# Patient Record
Sex: MALE | Race: WHITE | NOT HISPANIC OR LATINO | Employment: OTHER | URBAN - METROPOLITAN AREA
[De-identification: names, ages, dates, MRNs, and addresses within clinical notes are randomized per-mention and may not be internally consistent; named-entity substitution may affect disease eponyms.]

---

## 2018-02-20 ENCOUNTER — APPOINTMENT (OUTPATIENT)
Dept: GENERAL RADIOLOGY | Facility: HOSPITAL | Age: 43
End: 2018-02-20

## 2018-02-20 PROCEDURE — 73610 X-RAY EXAM OF ANKLE: CPT | Performed by: GENERAL PRACTICE

## 2018-02-20 PROCEDURE — 73630 X-RAY EXAM OF FOOT: CPT | Performed by: GENERAL PRACTICE

## 2018-02-27 ENCOUNTER — APPOINTMENT (OUTPATIENT)
Dept: GENERAL RADIOLOGY | Facility: HOSPITAL | Age: 43
End: 2018-02-27

## 2018-02-27 ENCOUNTER — HOSPITAL ENCOUNTER (EMERGENCY)
Facility: HOSPITAL | Age: 43
Discharge: HOME | End: 2018-02-27
Attending: EMERGENCY MEDICINE | Admitting: EMERGENCY MEDICINE

## 2018-02-27 VITALS
TEMPERATURE: 98 F | RESPIRATION RATE: 18 BRPM | BODY MASS INDEX: 23.86 KG/M2 | SYSTOLIC BLOOD PRESSURE: 125 MMHG | OXYGEN SATURATION: 96 % | DIASTOLIC BLOOD PRESSURE: 84 MMHG | WEIGHT: 180 LBS | HEIGHT: 73 IN | HEART RATE: 82 BPM

## 2018-02-27 DIAGNOSIS — S93.422A SPRAIN OF DELTOID LIGAMENT OF LEFT ANKLE, INITIAL ENCOUNTER: Primary | ICD-10-CM

## 2018-02-27 PROCEDURE — 73630 X-RAY EXAM OF FOOT: CPT

## 2018-02-27 PROCEDURE — 73610 X-RAY EXAM OF ANKLE: CPT

## 2018-02-27 PROCEDURE — 99283 EMERGENCY DEPT VISIT LOW MDM: CPT

## 2018-03-01 ENCOUNTER — OFFICE VISIT (OUTPATIENT)
Dept: ORTHOPEDIC SURGERY | Facility: CLINIC | Age: 43
End: 2018-03-01

## 2018-03-01 VITALS — BODY MASS INDEX: 23.86 KG/M2 | HEIGHT: 73 IN | WEIGHT: 180 LBS | TEMPERATURE: 98.8 F

## 2018-03-01 DIAGNOSIS — S93.402A SPRAIN OF LEFT ANKLE, UNSPECIFIED LIGAMENT, INITIAL ENCOUNTER: Primary | ICD-10-CM

## 2018-03-01 DIAGNOSIS — S86.302A INJURY OF PERONEAL TENDON OF LEFT FOOT, INITIAL ENCOUNTER: ICD-10-CM

## 2018-03-01 PROCEDURE — 99204 OFFICE O/P NEW MOD 45 MIN: CPT | Performed by: ORTHOPAEDIC SURGERY

## 2018-03-01 RX ORDER — NAPROXEN SODIUM 220 MG
220 TABLET ORAL 2 TIMES DAILY PRN
COMMUNITY

## 2018-03-01 NOTE — PROGRESS NOTES
New Patient Complaint      Patient: Lj Alicea  YOB: 1975 42 y.o. male  Medical Record Number: [id number]    Chief Complaints: I hurt my left ankle    History of Present Illness:   Patient injured left ankle while playing volleyball and landed on someone else's foot on 2/20/18 he felt and heard a pop in his left ankle.  He had immediate onset of quite a bit of swelling and bruising.  He was told he may have some fractures but they were sure.    Had persistent swelling and bruising and although the swelling improved to some degree he took a very hot bath the other day and had quite a bit of redness to the foot after that.    He went to the emergency room again on 2/27/18 and new x-rays were made.    He is seen here today for further evaluation.  Prior to this he was not having problems with the ankle.          HPI    Allergies:   Allergies   Allergen Reactions   • Penicillins Hives       Medications:   Current Outpatient Prescriptions on File Prior to Visit   Medication Sig   • Omega-3 Fatty Acids (OMEGA 3 PO) Take  by mouth. Relief factor     No current facility-administered medications on file prior to visit.        Past Medical History:   Diagnosis Date   • History of shingles      Past Surgical History:   Procedure Laterality Date   • TONSILLECTOMY       Social History     Occupational History   • Not on file.     Social History Main Topics   • Smoking status: Never Smoker   • Smokeless tobacco: Never Used   • Alcohol use Yes      Comment: socially   • Drug use: Defer   • Sexual activity: Defer      Social History     Social History Narrative     History reviewed. No pertinent family history.    Review of Systems: 14 point review of systems performed, positive pertinent findings identified in HPI. All remaining systems negative     Review of Systems      Physical Exam:   Vitals:    03/01/18 1355   Temp: 98.8 °F (37.1 °C)   TempSrc: Temporal Artery    Weight: 81.6 kg (180 lb)  Comment: per  "patient/boot on left foot   Height: 185.4 cm (73\")     Physical Exam   Constitutional: pleasant, well developed   Eyes: sclera non icteric  Hearing : adequate for exam  Cardiovascular: palpable pulses in left foot, left calf/ thigh NT without sign of DVT  Respiratoy: breathing unlabored   Neurological: grossly sensate to LT throughout left LE  Psychiatric: oriented with normal mood and affect.   Lymphatic: No palpable popliteal lymphadenopathy left LE  Skin: intact throughout left leg/foot  Musculoskeletal: Mild swelling to the left foot less so than to the left ankle.  There is significant amount of resolving ecchymosis inferolaterally.  There is erythema over the dorsolateral aspect of the foot which somewhat tender to palpation.  Pennsaid he showed me pictures of his foot after the injury and his redness was not there.  He states this happened after he took a bath it was too hot).  There was no focal discomfort over the first and second TMT joints today.  There was moderate discomfort over the lateral malleolus, anterolateral ligament structures and significantly along the peroneal tendons.  No palpable defect of the Achilles.  Could not do stress exam today due to discomfort he was able to actively hyacinth this was somewhat weak and painful for him.  Compartments were soft of the foot and leg  Physical Exam  Ortho Exam    Radiology: 3 views left foot and 3 views left ankle were reviewed each from 2/20/18 and 2/27/18.  I don't see any gross malalignment there was one question where area over the medial aspect of the base the second metatarsal on one of the views but it did not see any widening and is not tender there.    There is questionable ossification over the medial malleolus.  I do not see any gross malalignment or widening of the syndesmosis.    Assessment/Plan: Left ankle anterolateral ligaments injury with peroneal tendon injury and possible occult fracture of the lateral hindfoot.    We are going to get " an MRI of his left hindfoot start off with for evaluation of the structures rather than of the foot itself at this time.    In the interim he'll continue with his boot and may do touch down weightbearing only as well as elevation.    He understands to call to schedule follow-up appointment after MRI has been scheduled to review results in the office.

## 2018-03-09 DIAGNOSIS — S93.402A SPRAIN OF LEFT ANKLE, UNSPECIFIED LIGAMENT, INITIAL ENCOUNTER: ICD-10-CM

## 2018-03-09 DIAGNOSIS — S86.302A INJURY OF PERONEAL TENDON OF LEFT FOOT, INITIAL ENCOUNTER: ICD-10-CM

## 2018-03-12 ENCOUNTER — OFFICE VISIT (OUTPATIENT)
Dept: ORTHOPEDIC SURGERY | Facility: CLINIC | Age: 43
End: 2018-03-12

## 2018-03-12 VITALS — TEMPERATURE: 97.6 F | BODY MASS INDEX: 23.86 KG/M2 | HEIGHT: 73 IN | WEIGHT: 180 LBS

## 2018-03-12 DIAGNOSIS — S93.402D SPRAIN OF LEFT ANKLE, UNSPECIFIED LIGAMENT, SUBSEQUENT ENCOUNTER: Primary | ICD-10-CM

## 2018-03-12 DIAGNOSIS — S92.112A CLOSED DISPLACED FRACTURE OF NECK OF LEFT TALUS, INITIAL ENCOUNTER: ICD-10-CM

## 2018-03-12 DIAGNOSIS — T14.8XXA CONTUSION OF BONE: ICD-10-CM

## 2018-03-12 PROBLEM — S93.402A SPRAIN OF LEFT ANKLE: Status: ACTIVE | Noted: 2018-03-12

## 2018-03-12 PROCEDURE — 28430 CLTX TALUS FRACTURE W/O MNPJ: CPT | Performed by: ORTHOPAEDIC SURGERY

## 2018-03-12 PROCEDURE — 99213 OFFICE O/P EST LOW 20 MIN: CPT | Performed by: ORTHOPAEDIC SURGERY

## 2018-03-12 NOTE — PROGRESS NOTES
"Ankle Follow Up      Patient: Lj Alicea    YOB: 1975 42 y.o. male    Chief Complaints: Ankle pain    History of Present Illness: Patient follows up injury left ankle playing volleyball on 2/20/18.  He was initially seen on 3/1/18 and has since been using his boot and crutches.  Pain has improved to some degree but still with persistent intermittent swelling and mild to moderate intermittent aching pain in the left ankle.  HPI    ROS: ankle pain, no fevers chills chest pain or shortness of breath  Past Medical History:   Diagnosis Date   • History of shingles        Physical Exam:   Vitals:    03/12/18 1343   Temp: 97.6 °F (36.4 °C)   Weight: 81.6 kg (180 lb)   Height: 185.4 cm (73\")     Well developed with normal mood.Mild swelling to left ankle with discomfort over the medial deltoid and medial malleolus.  Some mild discomfort over the inferomedial aspect of the hindfoot and some in the sinus tarsi.  He's tender over the anterolateral ligamentous structures but did not perform stress testing today.      Radiology: MRI films and report left ankle from [facility] dated 3/7/18 reviewed which show bony contusion of the posterior medial malleolus with possible trabecular fracture but no clear cortical fracture also shallow cortical impaction fracture the anterior talar neck with bony contusion as well as contusion of the anterior calcaneal process.    There is also high-grade partial tearing of the ATFL and CFL as well as partial tearing of the deep fibers of the medial deltoid and stripping of the flexor retinaculum along the medial malleolar attachment.      Assessment/Plan: 1.  Left medial malleolar bony contusion with possible trabecular fracture  2.  Left inferior talar neck cortical impaction fracture  3.  Anterior calcaneal process contusion  4.  High-grade partial tearing of the ATFL and CFL  5.  Partial tearing deep deltoid  6.  Flexor retinaculum injury medial malleolar attachment     I " reviewed with him that do not see anything to do from an operative standpoint with this at this time.  He's about 3 weeks out from injury.  He may try weaning off the crutches or at least going to one crutch and demonstrated for him today as pain allows.  We'll have him begin some physical therapy at [email or web address] in [place] at his request    I'll see him back in about 2 weeks' x-rays of his left foot and ankle at that time